# Patient Record
Sex: MALE | Race: WHITE | URBAN - METROPOLITAN AREA
[De-identification: names, ages, dates, MRNs, and addresses within clinical notes are randomized per-mention and may not be internally consistent; named-entity substitution may affect disease eponyms.]

---

## 2017-01-05 ENCOUNTER — ANESTHESIA (OUTPATIENT)
Dept: SURGERY | Facility: CLINIC | Age: 52
End: 2017-01-05
Payer: COMMERCIAL

## 2017-01-05 ENCOUNTER — HOSPITAL ENCOUNTER (OUTPATIENT)
Facility: CLINIC | Age: 52
Discharge: HOME | End: 2017-01-05
Attending: OPHTHALMOLOGY | Admitting: OPHTHALMOLOGY
Payer: COMMERCIAL

## 2017-01-05 ENCOUNTER — ANESTHESIA EVENT (OUTPATIENT)
Dept: SURGERY | Facility: CLINIC | Age: 52
End: 2017-01-05
Payer: COMMERCIAL

## 2017-01-05 ENCOUNTER — SURGERY (OUTPATIENT)
Age: 52
End: 2017-01-05

## 2017-01-05 VITALS
RESPIRATION RATE: 12 BRPM | TEMPERATURE: 97.4 F | OXYGEN SATURATION: 94 % | DIASTOLIC BLOOD PRESSURE: 101 MMHG | SYSTOLIC BLOOD PRESSURE: 154 MMHG

## 2017-01-05 PROCEDURE — 27211045 ZZH EYE GAS ISPAN C3F8: Performed by: OPHTHALMOLOGY

## 2017-01-05 PROCEDURE — 25000132 ZZH RX MED GY IP 250 OP 250 PS 637: Performed by: OPHTHALMOLOGY

## 2017-01-05 PROCEDURE — 25000125 ZZHC RX 250: Performed by: NURSE ANESTHETIST, CERTIFIED REGISTERED

## 2017-01-05 PROCEDURE — 37000009 ZZH ANESTHESIA TECHNICAL FEE, EACH ADDTL 15 MIN: Performed by: OPHTHALMOLOGY

## 2017-01-05 PROCEDURE — 40000170 ZZH STATISTIC PRE-PROCEDURE ASSESSMENT II: Performed by: OPHTHALMOLOGY

## 2017-01-05 PROCEDURE — 25000308 HC RX OP HPI UCR WEL MED 250 IP 250: Performed by: OPHTHALMOLOGY

## 2017-01-05 PROCEDURE — 71000028 ZZH EYE RECOVERY PHASE 2 EACH 15 MINS: Performed by: OPHTHALMOLOGY

## 2017-01-05 PROCEDURE — 37000008 ZZH ANESTHESIA TECHNICAL FEE, 1ST 30 MIN: Performed by: OPHTHALMOLOGY

## 2017-01-05 PROCEDURE — 25000128 H RX IP 250 OP 636: Performed by: NURSE ANESTHETIST, CERTIFIED REGISTERED

## 2017-01-05 PROCEDURE — 27210995 ZZH RX 272: Performed by: OPHTHALMOLOGY

## 2017-01-05 PROCEDURE — 36000104 ZZH EYE SURGERY LEVEL 4 1ST 30 MIN: Performed by: OPHTHALMOLOGY

## 2017-01-05 PROCEDURE — 27210794 ZZH OR GENERAL SUPPLY STERILE: Performed by: OPHTHALMOLOGY

## 2017-01-05 PROCEDURE — 25800025 ZZH RX 258: Performed by: OPHTHALMOLOGY

## 2017-01-05 PROCEDURE — 71000004 ZZH RECOVERY EYE PHASE 1 LEVEL 1 FIRST HR: Performed by: OPHTHALMOLOGY

## 2017-01-05 PROCEDURE — 25000125 ZZHC RX 250: Performed by: OPHTHALMOLOGY

## 2017-01-05 PROCEDURE — 36000105 ZZH EYE SURGERY LEVEL 4 EA 15 ADDTL MIN: Performed by: OPHTHALMOLOGY

## 2017-01-05 RX ORDER — BUPIVACAINE HYDROCHLORIDE 7.5 MG/ML
INJECTION, SOLUTION EPIDURAL; RETROBULBAR PRN
Status: DISCONTINUED | OUTPATIENT
Start: 2017-01-05 | End: 2017-01-05 | Stop reason: HOSPADM

## 2017-01-05 RX ORDER — CYCLOPENTOLATE HYDROCHLORIDE 10 MG/ML
1 SOLUTION/ DROPS OPHTHALMIC
Status: COMPLETED | OUTPATIENT
Start: 2017-01-05 | End: 2017-01-05

## 2017-01-05 RX ORDER — FENTANYL CITRATE 50 UG/ML
INJECTION, SOLUTION INTRAMUSCULAR; INTRAVENOUS PRN
Status: DISCONTINUED | OUTPATIENT
Start: 2017-01-05 | End: 2017-01-05

## 2017-01-05 RX ORDER — ATROPINE SULFATE 10 MG/ML
SOLUTION/ DROPS OPHTHALMIC PRN
Status: DISCONTINUED | OUTPATIENT
Start: 2017-01-05 | End: 2017-01-05 | Stop reason: HOSPADM

## 2017-01-05 RX ORDER — ONDANSETRON 2 MG/ML
4 INJECTION INTRAMUSCULAR; INTRAVENOUS EVERY 30 MIN PRN
Status: DISCONTINUED | OUTPATIENT
Start: 2017-01-05 | End: 2017-01-05 | Stop reason: HOSPADM

## 2017-01-05 RX ORDER — SODIUM CHLORIDE, SODIUM LACTATE, POTASSIUM CHLORIDE, CALCIUM CHLORIDE 600; 310; 30; 20 MG/100ML; MG/100ML; MG/100ML; MG/100ML
INJECTION, SOLUTION INTRAVENOUS CONTINUOUS
Status: DISCONTINUED | OUTPATIENT
Start: 2017-01-05 | End: 2017-01-05 | Stop reason: HOSPADM

## 2017-01-05 RX ORDER — BRIMONIDINE TARTRATE 2 MG/ML
SOLUTION/ DROPS OPHTHALMIC PRN
Status: DISCONTINUED | OUTPATIENT
Start: 2017-01-05 | End: 2017-01-05 | Stop reason: HOSPADM

## 2017-01-05 RX ORDER — LIDOCAINE HYDROCHLORIDE 20 MG/ML
INJECTION, SOLUTION INFILTRATION; PERINEURAL PRN
Status: DISCONTINUED | OUTPATIENT
Start: 2017-01-05 | End: 2017-01-05

## 2017-01-05 RX ORDER — PROPOFOL 10 MG/ML
INJECTION, EMULSION INTRAVENOUS PRN
Status: DISCONTINUED | OUTPATIENT
Start: 2017-01-05 | End: 2017-01-05

## 2017-01-05 RX ORDER — ONDANSETRON 4 MG/1
4 TABLET, ORALLY DISINTEGRATING ORAL EVERY 30 MIN PRN
Status: DISCONTINUED | OUTPATIENT
Start: 2017-01-05 | End: 2017-01-05 | Stop reason: HOSPADM

## 2017-01-05 RX ORDER — SODIUM CHLORIDE, SODIUM LACTATE, POTASSIUM CHLORIDE, CALCIUM CHLORIDE 600; 310; 30; 20 MG/100ML; MG/100ML; MG/100ML; MG/100ML
1000 INJECTION, SOLUTION INTRAVENOUS CONTINUOUS
Status: DISCONTINUED | OUTPATIENT
Start: 2017-01-05 | End: 2017-01-05 | Stop reason: HOSPADM

## 2017-01-05 RX ORDER — NALOXONE HYDROCHLORIDE 0.4 MG/ML
.1-.4 INJECTION, SOLUTION INTRAMUSCULAR; INTRAVENOUS; SUBCUTANEOUS
Status: DISCONTINUED | OUTPATIENT
Start: 2017-01-05 | End: 2017-01-05 | Stop reason: HOSPADM

## 2017-01-05 RX ORDER — FENTANYL CITRATE 50 UG/ML
25-50 INJECTION, SOLUTION INTRAMUSCULAR; INTRAVENOUS
Status: DISCONTINUED | OUTPATIENT
Start: 2017-01-05 | End: 2017-01-05 | Stop reason: HOSPADM

## 2017-01-05 RX ORDER — MEPERIDINE HYDROCHLORIDE 25 MG/ML
12.5 INJECTION INTRAMUSCULAR; INTRAVENOUS; SUBCUTANEOUS
Status: DISCONTINUED | OUTPATIENT
Start: 2017-01-05 | End: 2017-01-05 | Stop reason: HOSPADM

## 2017-01-05 RX ORDER — ONDANSETRON 2 MG/ML
INJECTION INTRAMUSCULAR; INTRAVENOUS PRN
Status: DISCONTINUED | OUTPATIENT
Start: 2017-01-05 | End: 2017-01-05

## 2017-01-05 RX ORDER — BALANCED SALT SOLUTION 6.4; .75; .48; .3; 3.9; 1.7 MG/ML; MG/ML; MG/ML; MG/ML; MG/ML; MG/ML
SOLUTION OPHTHALMIC PRN
Status: DISCONTINUED | OUTPATIENT
Start: 2017-01-05 | End: 2017-01-05 | Stop reason: HOSPADM

## 2017-01-05 RX ORDER — TIMOLOL MALEATE 5 MG/ML
SOLUTION/ DROPS OPHTHALMIC PRN
Status: DISCONTINUED | OUTPATIENT
Start: 2017-01-05 | End: 2017-01-05 | Stop reason: HOSPADM

## 2017-01-05 RX ORDER — PHENYLEPHRINE HYDROCHLORIDE 25 MG/ML
1 SOLUTION/ DROPS OPHTHALMIC
Status: COMPLETED | OUTPATIENT
Start: 2017-01-05 | End: 2017-01-05

## 2017-01-05 RX ORDER — ALBUTEROL SULFATE 0.83 MG/ML
2.5 SOLUTION RESPIRATORY (INHALATION) EVERY 4 HOURS PRN
Status: DISCONTINUED | OUTPATIENT
Start: 2017-01-05 | End: 2017-01-05 | Stop reason: HOSPADM

## 2017-01-05 RX ORDER — DEXAMETHASONE SODIUM PHOSPHATE 4 MG/ML
INJECTION, SOLUTION INTRA-ARTICULAR; INTRALESIONAL; INTRAMUSCULAR; INTRAVENOUS; SOFT TISSUE PRN
Status: DISCONTINUED | OUTPATIENT
Start: 2017-01-05 | End: 2017-01-05

## 2017-01-05 RX ORDER — DEXAMETHASONE SODIUM PHOSPHATE 4 MG/ML
INJECTION, SOLUTION INTRA-ARTICULAR; INTRALESIONAL; INTRAMUSCULAR; INTRAVENOUS; SOFT TISSUE PRN
Status: DISCONTINUED | OUTPATIENT
Start: 2017-01-05 | End: 2017-01-05 | Stop reason: HOSPADM

## 2017-01-05 RX ORDER — ACETAZOLAMIDE 500 MG/1
500 CAPSULE, EXTENDED RELEASE ORAL ONCE
Status: COMPLETED | OUTPATIENT
Start: 2017-01-05 | End: 2017-01-05

## 2017-01-05 RX ADMIN — DEXAMETHASONE SODIUM PHOSPHATE 2 MG: 4 INJECTION, SOLUTION INTRAMUSCULAR; INTRAVENOUS at 20:30

## 2017-01-05 RX ADMIN — CYCLOPENTOLATE HYDROCHLORIDE 1 DROP: 10 SOLUTION/ DROPS OPHTHALMIC at 19:03

## 2017-01-05 RX ADMIN — Medication 1 APPLICATOR: at 19:59

## 2017-01-05 RX ADMIN — ONDANSETRON 4 MG: 2 INJECTION INTRAMUSCULAR; INTRAVENOUS at 19:31

## 2017-01-05 RX ADMIN — PHENYLEPHRINE HYDROCHLORIDE 1 DROP: 2.5 SOLUTION/ DROPS OPHTHALMIC at 19:03

## 2017-01-05 RX ADMIN — FENTANYL CITRATE 25 MCG: 50 INJECTION, SOLUTION INTRAMUSCULAR; INTRAVENOUS at 19:25

## 2017-01-05 RX ADMIN — CYCLOPENTOLATE HYDROCHLORIDE 1 DROP: 10 SOLUTION/ DROPS OPHTHALMIC at 18:52

## 2017-01-05 RX ADMIN — DEXMEDETOMIDINE 8 MCG: 100 INJECTION, SOLUTION, CONCENTRATE INTRAVENOUS at 19:25

## 2017-01-05 RX ADMIN — BALANCED SALT SOLUTION 15 ML: 6.4; .75; .48; .3; 3.9; 1.7 SOLUTION OPHTHALMIC at 19:55

## 2017-01-05 RX ADMIN — PHENYLEPHRINE HYDROCHLORIDE 1 DROP: 2.5 SOLUTION/ DROPS OPHTHALMIC at 19:00

## 2017-01-05 RX ADMIN — SODIUM CHLORIDE, POTASSIUM CHLORIDE, SODIUM LACTATE AND CALCIUM CHLORIDE: 600; 310; 30; 20 INJECTION, SOLUTION INTRAVENOUS at 18:52

## 2017-01-05 RX ADMIN — BUPIVACAINE HYDROCHLORIDE 6 ML: 7.5 INJECTION, SOLUTION EPIDURAL; RETROBULBAR at 19:59

## 2017-01-05 RX ADMIN — PHENYLEPHRINE HYDROCHLORIDE 1 DROP: 2.5 SOLUTION/ DROPS OPHTHALMIC at 18:52

## 2017-01-05 RX ADMIN — ATROPINE SULFATE 1 DROP: 10 SOLUTION OPHTHALMIC at 20:29

## 2017-01-05 RX ADMIN — CYCLOPENTOLATE HYDROCHLORIDE 1 DROP: 10 SOLUTION/ DROPS OPHTHALMIC at 19:00

## 2017-01-05 RX ADMIN — DEXAMETHASONE SODIUM PHOSPHATE 10 MG: 4 INJECTION, SOLUTION INTRAMUSCULAR; INTRAVENOUS at 19:44

## 2017-01-05 RX ADMIN — FENTANYL CITRATE 25 MCG: 50 INJECTION, SOLUTION INTRAMUSCULAR; INTRAVENOUS at 19:29

## 2017-01-05 RX ADMIN — EPINEPHRINE 500 ML: 1 INJECTION, SOLUTION, CONCENTRATE INTRAVENOUS at 19:56

## 2017-01-05 RX ADMIN — BRIMONIDINE TARTRATE 1 DROP: 2 SOLUTION/ DROPS OPHTHALMIC at 20:29

## 2017-01-05 RX ADMIN — TIMOLOL MALEATE 1 DROP: 5 SOLUTION OPHTHALMIC at 20:31

## 2017-01-05 RX ADMIN — BUPIVACAINE HYDROCHLORIDE 5 ML: 7.5 INJECTION, SOLUTION EPIDURAL; RETROBULBAR at 20:14

## 2017-01-05 RX ADMIN — PROPOFOL 50 MG: 10 INJECTION, EMULSION INTRAVENOUS at 19:35

## 2017-01-05 RX ADMIN — LIDOCAINE HYDROCHLORIDE 60 MG: 20 INJECTION, SOLUTION INFILTRATION; PERINEURAL at 19:29

## 2017-01-05 RX ADMIN — PROPOFOL 20 MG: 10 INJECTION, EMULSION INTRAVENOUS at 19:36

## 2017-01-05 RX ADMIN — ACETAZOLAMIDE 500 MG: 500 CAPSULE, EXTENDED RELEASE ORAL at 20:55

## 2017-01-05 RX ADMIN — SODIUM CHLORIDE, POTASSIUM CHLORIDE, SODIUM LACTATE AND CALCIUM CHLORIDE: 600; 310; 30; 20 INJECTION, SOLUTION INTRAVENOUS at 19:23

## 2017-01-05 RX ADMIN — HYPROMELLOSE 2906 (4000 MPA.S) AND HYPROMELLOSE 2906 (50 MPA.S) 2 DROP: 25; 25 SOLUTION OPHTHALMIC at 20:14

## 2017-01-05 RX ADMIN — WATER 0.5 ML: 1 INJECTION INTRAMUSCULAR; INTRAVENOUS; SUBCUTANEOUS at 20:30

## 2017-01-05 RX ADMIN — MIDAZOLAM HYDROCHLORIDE 2 MG: 1 INJECTION, SOLUTION INTRAMUSCULAR; INTRAVENOUS at 19:25

## 2017-01-05 RX ADMIN — DEXMEDETOMIDINE 4 MCG: 100 INJECTION, SOLUTION, CONCENTRATE INTRAVENOUS at 19:39

## 2017-01-05 RX ADMIN — DEXMEDETOMIDINE 4 MCG: 100 INJECTION, SOLUTION, CONCENTRATE INTRAVENOUS at 19:41

## 2017-01-05 ASSESSMENT — ENCOUNTER SYMPTOMS: SEIZURES: 0

## 2017-01-05 ASSESSMENT — LIFESTYLE VARIABLES: TOBACCO_USE: 1

## 2017-01-05 NOTE — IP AVS SNAPSHOT
[facility] Day [facility]    [address]    [place] [address]    Phone:  [phone]    Fax:  [phone]                                       After Visit Summary   1/5/2017    Camden Leblanc    MRN: [id number]           After Visit Summary Signature Page     I have received my discharge instructions, and my questions have been answered. I have discussed any challenges I see with this plan with the nurse or doctor.    ..........................................................................................................................................  Patient/Patient Representative Signature      ..........................................................................................................................................  Patient Representative Print Name and Relationship to Patient    ..................................................               ................................................  Date                                            Time    ..........................................................................................................................................  Reviewed by Signature/Title    ...................................................              ..............................................  Date                                                            Time

## 2017-01-05 NOTE — IP AVS SNAPSHOT
MRN:[id number]                      After Visit Summary   1/5/2017    Camden Leblanc    MRN: [id number]           Thank you!     Thank you for choosing [facility] for your care. Our goal is always to provide you with excellent care. Hearing back from our patients is one way we can continue to improve our services. Please take a few minutes to complete the written survey that you may receive in the mail after you visit with us. Thank you!        Patient Information     Date Of Birth          1965        About your hospital stay     You were admitted on:  January 5, 2017 You last received care in the:  [facility]    You were discharged on:  January 5, 2017       Who to Call     For medical emergencies, please call 911.  For non-urgent questions about your medical care, please call your primary care provider or clinic, [phone]  For questions related to your surgery, please call your surgery clinic        Attending Provider     Provider    Soham Jones MD       Primary Care Provider Office Phone # Fax #    Jamil Law [phone]       [facility] [place] [address] [place] [address]        After Care Instructions     Activity       Avoid strenuous activities the next several days.                  Follow-up Appointments     Follow Up       To see surgeon tomorrow as scheduled.  Bring all eye medications to follow up visit.  Please open and familiarize yourself with the eyedrops but you do not have to put them in the eye.                    Your next 10 appointments already scheduled     Jan 05, 2017   Procedure with Soham Jones MD   [facility] PeriOP [facility] (--)    [address]  [facility] [phone]              Further instructions from your care team       POSITION:  [facility] Anest[facility] Care [facility] Discharge  Instructions  Anesthesia (Eye Care Center)    Adult Discharge Instructions    For 24 hours after surgery    1. Get plenty of rest.  Make arrangements to have a responsible adult stay with you for at least 6 hours after you leave the hospital.  2. Do not drive or use heavy equipment for 24 hours.    3. Do not drink alcohol for 24 hours.  4. Do not sign legal documents or make important decisions for 24 hours.  5. Avoid strenuous or risky activities. You may feel lightheaded.  If so, sit for a few minutes before standing.  Have someone help you get up.   6. Conscious sedation patients may resume a regular diet..  7. Any questions of medical nature, call your physician.    [facility], [address]  [address]  [place] [address] [phone]    PATIENT INSTRUCTIONS - RETINA SURGERY    Common retina operations    Surgery for retinal detachments.    Surgery to remove blood in the eye.    Surgery to remove scar tissue from retina.    Surgery to close macular holes.    Surgery to repair dislocated lens    After the surgery    Many retina operations involve the use of gas bubbles, or oil bubbles in the eye.  If this is true in your case, you may be asked to position a certain way following the surgery.  The nurse will go over this in detail with you.    When you go home, you can eat and drink as much as you feel comfortable.  Many retina operations make you feel less hungry or even a little nauseous.  This is to be expected.    You will be given eye drops prescriptions to fill that day.  You don t need to start the drops until the next day.  Please bring these drops with you to the doctor.      You may take a bath or shower as usual for you.  If the patch falls off, please simply leave it off.    It is normal to have some moderate discomfort, and nausea.  The doctor may give you pain medication to help with this discomfort.  If nothing was prescribed for you, you can take ibuprofen(Advil) or acetaminopen  (Tylenol) as directed on the bottle. If you have significant discomfort that isn t relieved with pain medications, you should call [facility] at [phone] and speak to your doctor.    Recovering after surgery    Retina operations are not like cataract surgery.  The vision often takes weeks or months to fully improve following the surgery.  DO NOT BE DISCOURAGED IF YOU DON T SEE WELL IMMEDIATELY FOLLOWING THE SURGERY.  PATIENTS WITH GAS BUBBLES IN THE EYE CAN T SEE ANY DETAIL AT ALL UNTIL THE GAS BUBBLE RESORBS.      Patients that have a gas/air bubble placed in their eye will have a green medical alert band on their wrist.  This band should not be removed until the doctor removes it for you or gives you permission to remove it.    You cannot fly in an airplane or drive into the mountains as long as the air or gas bubble remains in your eye.    You will have eye drops to use over the first several weeks following the surgery.  The doctor will give you detailed instructions on when to take them on your post-op visit.    If positioning is required following the surgery, it usually is required for 5-7 days.    Most people do not feel able to return to work for at least one week and sometimes up to two or three weeks following the surgery.    Frequently asked questions    What do you mean when you say positioning following retinal surgery?    Gas or oil bubbles are used in retina surgery to either close holes or help keep the retina attached.  The gas bubbles rises up presses against the highest position of the eye.  Depending on the area of damage to the retina, your doctor may ask you to position on your left side, right side, face down, or upright, or some combination of these positions.  You should try to stay in that position 90% of the time, taking breaks to eat, stretch, go to the bathroom, and have a bath or shower.  You can rent a massage chair that often helps in this position.  At night you  should do your best to stay in this position as well.  Your doctor knows how difficult this can be, but can t stress enough how important it is for success of the surgery.  Your doctor will tell you how long this is necessary.    What symptoms should concern me following surgery?    You should be concerned if:    The eye becomes increasingly more painful and the pain medication stops working.    The vision gets darker or dimmer.    Green thick discharge appears.    What are the drops for?    One drop will be an antibiotic.  It is meant to prevent infection.    One drop will be Pred Forte.  It is a steroid drop.  It is meant to reduce inflammation and promote healing.  It usually is continued for the longest time and is gradually tapered over several weeks.    One drop will be atropine.  It dilates the pupil and prevents the iris from going into spasm.  It is usually used for 1-2 weeks.    Many times patients are started on other drops to lower the pressure in the eye.  These are adjusted as needed.  Sometimes, even pills are required to lower the pressure in the eye.    Pending Results     No orders found from 1/4/2017 to 1/6/2017.            Admission Information        Provider Department Dept Phone    1/5/2017 SHAHANA THRASHER MD [facility] [phone]      Your Vitals Were     Blood Pressure Temperature Respirations Pulse Oximetry          146/95 mmHg 97  F (36.1  C) (Temporal) 13 93%        MyChart Information     [facility] gives you secure access to your electronic health record. If you see a primary care provider, you can also send messages to your care team and make appointments. If you have questions, please call your primary care clinic.  If you do not have a primary care provider, please call [phone] and they will assist you.        Care EveryWhere ID     This is your Care EveryWhere ID. This could be used by other organizations to access your [facility] medical records  [id number]           Review  of your medicines      CONTINUE these medicines which have NOT CHANGED        Dose / Directions    ASPIRIN PO        Dose:  81 mg   Take 81 mg by mouth daily   Refills:  0       FIBERCON PO        Take by mouth daily   Refills:  0       INDOCIN PO        Dose:  50 mg   Take 50 mg by mouth 3 times daily   Refills:  0       multivitamin, therapeutic Tabs tablet        Dose:  1 tablet   Take 1 tablet by mouth daily   Refills:  0       nicotine 14 MG/24HR 24 hr patch   Commonly known as:  NICODERM CQ        Dose:  1 patch   Place 1 patch onto the skin every 24 hours   Refills:  0       NONFORMULARY        daily ODEFESEY   Refills:  0       triamterene-hydrochlorothiazide 37.5-25 MG per tablet   Commonly known as:  MAXZIDE-25        Dose:  1 tablet   Take 1 tablet by mouth daily   Refills:  0                Protect others around you: Learn how to safely use, store and throw away your medicines at www.disposemymeds.org.             Medication List: This is a list of all your medications and when to take them. Check marks below indicate your daily home schedule. Keep this list as a reference.      Medications           Morning Afternoon Evening Bedtime As Needed    ASPIRIN PO   Take 81 mg by mouth daily                                FIBERCON PO   Take by mouth daily                                INDOCIN PO   Take 50 mg by mouth 3 times daily                                multivitamin, therapeutic Tabs tablet   Take 1 tablet by mouth daily                                nicotine 14 MG/24HR 24 hr patch   Commonly known as:  NICODERM CQ   Place 1 patch onto the skin every 24 hours                                NONFORMULARY   daily ODEFESEY                                triamterene-hydrochlorothiazide 37.5-25 MG per tablet   Commonly known as:  MAXZIDE-25   Take 1 tablet by mouth daily

## 2017-01-06 NOTE — OP NOTE
DATE OF PROCEDURE:  01/05/2017       PREOPERATIVE DIAGNOSIS:  Right retinal detachment.      POSTOPERATIVE DIAGNOSIS:  Right retinal detachment.      PROCEDURES:   1.  Right vitrectomy.   2.  Right perfluorocarbon.   3.  Right cryopexy.   4.  Right endolaser.   5.  Right air-fluid exchange.   6.  Right C3F8 gas-gas exchange, 15%.      COMPLICATIONS:  None.      ESTIMATED BLOOD LOSS:  Less than 5 mL.      INDICATIONS:  To reattach the retina.  The risks of the operation including a 10% chance of recurrence, 1:1000 risk of infection or hemorrhage, need to position and uncertain visual prognosis were all explained to him.  He wished to proceed.      OPERATIVE DESCRIPTION:  Retrobulbar anesthesia was obtained uneventfully.  Following this, the right eye was prepped and draped in the usual fashion.  A wire speculum was inserted in the right fornix.  The eye was entered 4 mm posterior to the limbus in the inferotemporal quadrant with a 23-gauge trocar placed directly, visualized in the vitreous cavity and connected to 1 liter bottle of BSS to which 0.3 mL of 1:1000 adrenalin was added.  Two more 23-gauge trocars were inserted supratemporally and superonasally 4 mm posterior to the limbus.  The eye was entered with a light pipe and a vitreous cutter, and a core vitrectomy was performed under wide-angle illumination.  A posterior vitreous attachment was already present, and vitreous was removed for 360 degrees out to the vitreous base using scleral depression.  Once this was completed, inspection revealed a retinal detachment that extended from the 8 o'clock to the 2 o'clock meridian with 3 horseshoe retinal tears noted at 10:00, 11:00 and 12:30.  They were marked with endodiathermy.  A fourth horseshoe retinal tear was noted at the 6:30 meridian in attached retina.  It was surrounded by endolaser.  Following this, perfluorocarbon was instilled into posterior aspect of the previously-mentioned retinal tears.  Cryopexy was  applied around all 3 retinal tears and detached retina as well as prophylactically around the supratemporal and superonasal sclerotomy sites.  An air-fluid exchange was performed and the retina flattened nicely.  Endolaser was applied for 360 degrees along the peripheral vitreous base.  Residual fluid was then removed from the posterior pole.  The superonasal trocar was removed and the sclerotomies closed with 6-0 plain gut suture.  A 15% C3F8 gas-gas exchange was performed through the infusion cannula and vented through the superotemporal trocar.  Both remaining trocars were then removed and the sclerotomies closed with 6-0 plain gut suture.  The intraocular pressure was approximately 10.  Subconjunctival injections of Ancef and Decadron were given.  Atropine, Alphagan, Timolol and Betadine were instilled in the right fornix.  The speculum was removed and the eye was patched in the usual fashion.  No complications were noted.         SHAHANA THRASHER MD             D: 2017 20:38   T: 2017 10:19   MT: [id number]      Name:     MERA GREEN   MRN:      [id number]        Account:        [id number]   :      1965           Procedure Date: 2017      Document: [id number]

## 2017-01-06 NOTE — ANESTHESIA PREPROCEDURE EVALUATION
Procedure: Procedure(s):  VITRECTOMY PARSPLANA WITH 23 GAUGE SYSTEM  Preop diagnosis: LEFT EYE RETINAL DETACHMENT  No Known Allergies  There is no problem list on file for this patient.    Past Medical History   Diagnosis Date     HIV (human immunodeficiency virus infection) (H)      Hypertension      Obesity      Past Surgical History   Procedure Laterality Date     Abdomen surgery       REVISED COLOSTOMY     Hernia repair       TIMES 2     Eye surgery       BILATERAL CATARACT     Vitrectomy parsplana with 23 gauge system Left 8/10/2016     Procedure: VITRECTOMY PARSPLANA WITH 23 GAUGE SYSTEM;  Surgeon: Soham Jones MD;  Location: [facility]     Cryotherapy Left 8/10/2016     Procedure: CRYOTHERAPY;  Surgeon: Soham Jones MD;  Location: [facility]       No current facility-administered medications on file prior to encounter.  Current Outpatient Prescriptions on File Prior to Encounter:  triamterene-hydrochlorothiazide (MAXZIDE-25) 37.5-25 MG per tablet Take 1 tablet by mouth daily   ASPIRIN PO Take 81 mg by mouth daily   multivitamin, therapeutic (THERA-VIT) TABS Take 1 tablet by mouth daily   nicotine (NICODERM CQ) 14 MG/24HR patch 2h hr Place 1 patch onto the skin every 24 hours   Indomethacin (INDOCIN PO) Take 50 mg by mouth 3 times daily   NONFORMULARY daily ODEFESEY   Calcium Polycarbophil (FIBERCON PO) Take by mouth daily     There were no vitals taken for this visit.        Anesthesia Evaluation     . Pt has had prior anesthetic.     No history of anesthetic complications     ROS/MED HX    ENT/Pulmonary:     (+)tobacco use, Current use , . .   (-) sleep apnea and recent URI   Neurologic:  - neg neurologic ROS    (-) seizures, CVA and migraines   Cardiovascular:     (+) hypertension----. : . . . :. .       METS/Exercise Tolerance:     Hematologic:  - neg hematologic  ROS       Musculoskeletal:  - neg musculoskeletal ROS       GI/Hepatic:  - neg GI/hepatic ROS      (-) GERD   Renal/Genitourinary:  - ROS  Renal section negative       Endo:     (+) Obesity, .   (-) Type II DM and thyroid disease   Psychiatric:  - neg psychiatric ROS       Infectious Disease:   (+) HIV,       Malignancy:      - no malignancy   Other:    - neg other ROS           Physical Exam  Normal systems: cardiovascular, pulmonary and dental    Airway   Mallampati: II  TM distance: >3 FB  Neck ROM: full    Dental     Cardiovascular   Rhythm and rate: regular and normal      Pulmonary    breath sounds clear to auscultation                        Anesthesia Plan      History & Physical Review  History and physical reviewed and following examination; no interval change.    ASA Status:  2 .    NPO Status:  > 8 hours    Plan for MAC Reason for MAC:  Procedure to face, neck, head or breast         Postoperative Care  Postoperative pain management:  IV analgesics.      Consents  Anesthetic plan, risks, benefits and alternatives discussed with:  Patient..          Procedure: Procedure(s):  VITRECTOMY PARSPLANA WITH 23 GAUGE SYSTEM  Preop diagnosis: UNKNOWN    No Known Allergies  Past Medical History   Diagnosis Date     HIV (human immunodeficiency virus infection) (H)      Hypertension      Obesity      Past Surgical History   Procedure Laterality Date     Abdomen surgery       REVISED COLOSTOMY     Hernia repair       TIMES 2     Eye surgery       BILATERAL CATARACT     Vitrectomy parsplana with 23 gauge system Left 8/10/2016     Procedure: VITRECTOMY PARSPLANA WITH 23 GAUGE SYSTEM;  Surgeon: Soham Jones MD;  Location: [facility]     Cryotherapy Left 8/10/2016     Procedure: CRYOTHERAPY;  Surgeon: Soham Jones MD;  Location: [facility]     Prior to Admission medications    Medication Sig Start Date End Date Taking? Authorizing Provider   triamterene-hydrochlorothiazide (MAXZIDE-25) 37.5-25 MG per tablet Take 1 tablet by mouth daily    Reported, Patient   ASPIRIN PO Take 81 mg by mouth daily    Reported, Patient   multivitamin, therapeutic  (THERA-VIT) TABS Take 1 tablet by mouth daily    Reported, Patient   nicotine (NICODERM CQ) 14 MG/24HR patch 2h hr Place 1 patch onto the skin every 24 hours    Reported, Patient   Indomethacin (INDOCIN PO) Take 50 mg by mouth 3 times daily    Reported, Patient   NONFORMULARY daily ODEFESEY    Reported, Patient   Calcium Polycarbophil (FIBERCON PO) Take by mouth daily    Reported, Patient     Current Facility-Administered Medications Ordered in Epic   Medication Dose Route Frequency Last Rate Last Dose     lactated ringers infusion   Intravenous Continuous         cyclopentolate (CYCLOGYL) 1 % ophthalmic solution 1 drop  1 drop Right Eye q5 Min Prior to Surgery         phenylephrine (MYDFRIN /EDITA-SYNEPHRINE) 2.5 % ophthalmic solution 1 drop  1 drop Right Eye q5 Min Prior to Surgery         No current [facility]-ordered outpatient prescriptions on file.     Wt Readings from Last 1 Encounters:   08/10/16 140.615 kg (310 lb)     Temp Readings from Last 1 Encounters:   08/10/16 36.6  C (97.8  F) Temporal     BP Readings from Last 6 Encounters:   08/10/16 152/104     Pulse Readings from Last 4 Encounters:   08/10/16 60     Resp Readings from Last 1 Encounters:   08/10/16 16     SpO2 Readings from Last 1 Encounters:   08/10/16 95%     No results for input(s): NA, POTASSIUM, CHLORIDE, CO2, ANIONGAP, GLC, BUN, CR, SUNG in the last 64339 hours.  No results for input(s): WBC, HGB, PLT in the last 73324 hours.  No results for input(s): INR in the last 34316 hours.    Invalid input(s): APTT   RECENT LABS:   ECG:   ECHO:   CXR:                      .

## 2017-01-06 NOTE — BRIEF OP NOTE
preop dx - right rd  Post op dx - same  Proc - right ppv, pfc, cryo, afx, el, c3f8 15%  Comp - none  ebl - zero

## 2017-01-06 NOTE — PROGRESS NOTES
Pt B/P was elevated on arrival...... B/P 154/101 at discharge ..... Spoke with Dr. Mccabe and he agrees that it is ok to discharge the patient home

## 2017-01-06 NOTE — DISCHARGE INSTRUCTIONS
POSITION:  [facility] Discharge  Instructions  Anesthesia ([facility])   Adult Discharge Instructions    For 24 hours after surgery    1. Get plenty of rest.  Make arrangements to have a responsible adult stay with you for at least 6 hours after you leave the hospital.  2. Do not drive or use heavy equipment for 24 hours.    3. Do not drink alcohol for 24 hours.  4. Do not sign legal documents or make important decisions for 24 hours.  5. Avoid strenuous or risky activities. You may feel lightheaded.  If so, sit for a few minutes before standing.  Have someone help you get up.   6. Conscious sedation patients may resume a regular diet..  7. Any questions of medical nature, call your physician.    [facility], [address]  [address]  [place] [address] [phone]    PATIENT INSTRUCTIONS - RETINA SURGERY    Common retina operations    Surgery for retinal detachments.    Surgery to remove blood in the eye.    Surgery to remove scar tissue from retina.    Surgery to close macular holes.    Surgery to repair dislocated lens    After the surgery    Many retina operations involve the use of gas bubbles, or oil bubbles in the eye.  If this is true in your case, you may be asked to position a certain way following the surgery.  The nurse will go over this in detail with you.    When you go home, you can eat and drink as much as you feel comfortable.  Many retina operations make you feel less hungry or even a little nauseous.  This is to be expected.    You will be given eye drops prescriptions to fill that day.  You don t need to start the drops until the next day.  Please bring these drops with you to the doctor.      You may take a bath or shower as usual for you.  If the patch falls off, please simply leave it off.    It is normal to have some moderate discomfort, and nausea.  The doctor may give you pain medication to  help with this discomfort.  If nothing was prescribed for you, you can take ibuprofen(Advil) or acetaminopen (Tylenol) as directed on the bottle. If you have significant discomfort that isn t relieved with pain medications, you should call [facility] at [phone] and speak to your doctor.    Recovering after surgery    Retina operations are not like cataract surgery.  The vision often takes weeks or months to fully improve following the surgery.  DO NOT BE DISCOURAGED IF YOU DON T SEE WELL IMMEDIATELY FOLLOWING THE SURGERY.  PATIENTS WITH GAS BUBBLES IN THE EYE CAN T SEE ANY DETAIL AT ALL UNTIL THE GAS BUBBLE RESORBS.      Patients that have a gas/air bubble placed in their eye will have a green medical alert band on their wrist.  This band should not be removed until the doctor removes it for you or gives you permission to remove it.    You cannot fly in an airplane or drive into the mountains as long as the air or gas bubble remains in your eye.    You will have eye drops to use over the first several weeks following the surgery.  The doctor will give you detailed instructions on when to take them on your post-op visit.    If positioning is required following the surgery, it usually is required for 5-7 days.    Most people do not feel able to return to work for at least one week and sometimes up to two or three weeks following the surgery.    Frequently asked questions    What do you mean when you say positioning following retinal surgery?    Gas or oil bubbles are used in retina surgery to either close holes or help keep the retina attached.  The gas bubbles rises up presses against the highest position of the eye.  Depending on the area of damage to the retina, your doctor may ask you to position on your left side, right side, face down, or upright, or some combination of these positions.  You should try to stay in that position 90% of the time, taking breaks to eat, stretch, go to the bathroom,  and have a bath or shower.  You can rent a massage chair that often helps in this position.  At night you should do your best to stay in this position as well.  Your doctor knows how difficult this can be, but can t stress enough how important it is for success of the surgery.  Your doctor will tell you how long this is necessary.    What symptoms should concern me following surgery?    You should be concerned if:    The eye becomes increasingly more painful and the pain medication stops working.    The vision gets darker or dimmer.    Green thick discharge appears.    What are the drops for?    One drop will be an antibiotic.  It is meant to prevent infection.    One drop will be Pred Forte.  It is a steroid drop.  It is meant to reduce inflammation and promote healing.  It usually is continued for the longest time and is gradually tapered over several weeks.    One drop will be atropine.  It dilates the pupil and prevents the iris from going into spasm.  It is usually used for 1-2 weeks.    Many times patients are started on other drops to lower the pressure in the eye.  These are adjusted as needed.  Sometimes, even pills are required to lower the pressure in the eye.

## 2017-01-06 NOTE — OR NURSING
Camden Leblanc arrived to PACU at 2041...... He is alert oriented and denies pain...... Nurse will continue to monitor

## 2017-01-06 NOTE — ANESTHESIA POSTPROCEDURE EVALUATION
Patient: Camden Rozendaal    VITRECTOMY PARSPLANA WITH 23 GAUGE SYSTEM (Right Eye)  CRYOTHERAPY (Right )  Additional InformationProcedure(s):  RIGHT 23 GAUGE VITRECTOMY, PERFLURON, ENDOLASER, CRYOTHERAPY, AIR FLUID EXCHANGE, INFUSION OF 15% C3F8 GAS   - Wound Class: I-Clean   - Wound Class: I-Clean    Diagnosis:UNKNOWN  Diagnosis Additional Information: No value filed.    Anesthesia Type:  MAC    Note:  Anesthesia Post Evaluation    Patient location during evaluation: bedside  Patient participation: Able to fully participate in evaluation  Level of consciousness: awake  Pain management: adequate  Airway patency: patent  Cardiovascular status: acceptable  Respiratory status: acceptable  Hydration status: acceptable  PONV: none     Anesthetic complications: None    Comments: No anesthetic complications noted.         Last vitals:  Filed Vitals:    01/05/17 2110 01/05/17 2120 01/05/17 2130   BP: 150/93 157/109 154/101   Temp:   36.3  C (97.4  F)   Resp: 12 12 12   SpO2:          Electronically Signed By: Zeeshan Mccabe DO, DO  January 5, 2017  10:41 PM

## 2020-03-10 ENCOUNTER — HEALTH MAINTENANCE LETTER (OUTPATIENT)
Age: 55
End: 2020-03-10

## 2020-12-27 ENCOUNTER — HEALTH MAINTENANCE LETTER (OUTPATIENT)
Age: 55
End: 2020-12-27

## 2021-04-24 ENCOUNTER — HEALTH MAINTENANCE LETTER (OUTPATIENT)
Age: 56
End: 2021-04-24

## 2021-10-09 ENCOUNTER — HEALTH MAINTENANCE LETTER (OUTPATIENT)
Age: 56
End: 2021-10-09

## 2022-05-16 ENCOUNTER — HEALTH MAINTENANCE LETTER (OUTPATIENT)
Age: 57
End: 2022-05-16

## 2022-09-11 ENCOUNTER — HEALTH MAINTENANCE LETTER (OUTPATIENT)
Age: 57
End: 2022-09-11

## 2023-06-03 ENCOUNTER — HEALTH MAINTENANCE LETTER (OUTPATIENT)
Age: 58
End: 2023-06-03

## (undated) DEVICE — TAPE SILICONE KIND REMOVAL 1" 2770S-1

## (undated) DEVICE — EYE DRSG PAD OVAL

## (undated) DEVICE — EYE SOL BSS 500ML

## (undated) DEVICE — SYR 05ML SLIP TIP W/O NDL 309647

## (undated) DEVICE — SYR 01ML 25GA 5/8" TBC

## (undated) DEVICE — EYE DRAPE MICROSCOPE UNIVERSAL (BIOM FULL) 08-MK55140

## (undated) DEVICE — EYE GAS C3F8 PER USE/CC/ML  8065797101

## (undated) DEVICE — EYE PROBE LASER 23GA FLEX RFID 8065751113

## (undated) DEVICE — SU PLAIN 6-0 TG140-8 18" 1735G

## (undated) DEVICE — LINEN TOWEL PACK X5 5464

## (undated) DEVICE — GLOVE PROTEXIS MICRO 7.5  2D73PM75

## (undated) DEVICE — EYE KIT AUTO GAS FOR CONSTELLATION 8065751014

## (undated) DEVICE — NDL 18GA 1.5" 305196

## (undated) DEVICE — SYR 05ML SLIP TIP W/O NDL

## (undated) DEVICE — EYE CANN SOFT TIP 23GA FOR VALVED SET 8065149527

## (undated) DEVICE — EYE NDL RETROBULBAR 25GA 1.5" 581275

## (undated) DEVICE — DRSG STERI STRIP 1/2X4" R1547

## (undated) DEVICE — EYE SHIELD PLASTIC

## (undated) DEVICE — EYE PERFLUORON KIT 5ML 8065900163

## (undated) DEVICE — EYE PACK 23GA CONSTELLATION PPK4254-03

## (undated) DEVICE — NDL 27GA 0.5" 305109

## (undated) DEVICE — FORCEP TIP ILM 23GA 703.44

## (undated) DEVICE — GLOVE PROTEXIS MICRO 7.0  2D73PM70

## (undated) DEVICE — APPLICATOR COTTON TIP 3" PKG OF 10 34831010

## (undated) DEVICE — PACK VITRECTOMY PQ15VI57E

## (undated) RX ORDER — DEXAMETHASONE SODIUM PHOSPHATE 4 MG/ML
INJECTION, SOLUTION INTRA-ARTICULAR; INTRALESIONAL; INTRAMUSCULAR; INTRAVENOUS; SOFT TISSUE
Status: DISPENSED
Start: 2017-01-05

## (undated) RX ORDER — TRIAMCINOLONE ACETONIDE 40 MG/ML
INJECTION, SUSPENSION INTRA-ARTICULAR; INTRAMUSCULAR
Status: DISPENSED
Start: 2017-01-05

## (undated) RX ORDER — ACETAZOLAMIDE 500 MG/1
CAPSULE, EXTENDED RELEASE ORAL
Status: DISPENSED
Start: 2017-01-05

## (undated) RX ORDER — LIDOCAINE HYDROCHLORIDE 40 MG/ML
INJECTION, SOLUTION RETROBULBAR
Status: DISPENSED
Start: 2017-01-05

## (undated) RX ORDER — BUPIVACAINE HYDROCHLORIDE 7.5 MG/ML
INJECTION, SOLUTION EPIDURAL; RETROBULBAR
Status: DISPENSED
Start: 2017-01-05

## (undated) RX ORDER — ERYTHROMYCIN 5 MG/G
OINTMENT OPHTHALMIC
Status: DISPENSED
Start: 2017-01-05

## (undated) RX ORDER — ATROPINE SULFATE 10 MG/ML
SOLUTION/ DROPS OPHTHALMIC
Status: DISPENSED
Start: 2017-01-05

## (undated) RX ORDER — FENTANYL CITRATE 50 UG/ML
INJECTION, SOLUTION INTRAMUSCULAR; INTRAVENOUS
Status: DISPENSED
Start: 2017-01-05